# Patient Record
Sex: FEMALE | Race: WHITE
[De-identification: names, ages, dates, MRNs, and addresses within clinical notes are randomized per-mention and may not be internally consistent; named-entity substitution may affect disease eponyms.]

---

## 2017-12-05 ENCOUNTER — HOSPITAL ENCOUNTER (OUTPATIENT)
Dept: HOSPITAL 45 - C.NUCL | Age: 32
Discharge: HOME | End: 2017-12-05
Attending: UROLOGY
Payer: COMMERCIAL

## 2017-12-05 DIAGNOSIS — N13.30: Primary | ICD-10-CM

## 2017-12-05 LAB
ANION GAP SERPL CALC-SCNC: 4 MMOL/L (ref 3–11)
BUN SERPL-MCNC: 9 MG/DL (ref 7–18)
BUN/CREAT SERPL: 9.9 (ref 10–20)
CALCIUM SERPL-MCNC: 8.8 MG/DL (ref 8.5–10.1)
CHLORIDE SERPL-SCNC: 106 MMOL/L (ref 98–107)
CO2 SERPL-SCNC: 27 MMOL/L (ref 21–32)
CREAT SERPL-MCNC: 0.86 MG/DL (ref 0.6–1.2)
GLUCOSE SERPL-MCNC: 82 MG/DL (ref 70–99)
POTASSIUM SERPL-SCNC: 3.7 MMOL/L (ref 3.5–5.1)
SODIUM SERPL-SCNC: 137 MMOL/L (ref 136–145)

## 2017-12-05 NOTE — DIAGNOSTIC IMAGING REPORT
RENAL SCAN DIURETIC (MAG 3)



HISTORY:  32 years-old Female N13.30 [id number] hydronephrosis of

the right kidney with back pain.



COMPARISON: None available



TECHNIQUE: Renal scan was obtained with 8.8 mCi technetium 99 MAG3. Lasix was

also administered.



FINDINGS: 

There is normal flow and excretion of the left kidney. The left renal collecting

system and left renal pelvis appears somewhat patulous.



There is asymmetrically decreased flow and excretion of the right kidney. Split

function is 88.7% on the left and 11.3% on the right. There is delayed excretion

of contrast from the right kidney. Excretion is difficult to quantify secondary

to lack of significant tracer within the right kidney and collecting system. No

significant tracer retention.



IMPRESSION:

Significantly decreased flow and excretion of the right kidney. Split function

of 88.7% on the left and 11.3% on the right.









The above report was generated using voice recognition software. It may contain

grammatical, syntax or spelling errors.







Electronically signed by:  Rasta Aguilera M.D.

12/5/2017 1:59 PM



Dictated Date/Time:  12/5/2017 12:52 PM

## 2017-12-27 ENCOUNTER — HOSPITAL ENCOUNTER (OUTPATIENT)
Dept: HOSPITAL 45 - C.CPL | Age: 32
Discharge: HOME | End: 2017-12-27
Attending: UROLOGY
Payer: COMMERCIAL

## 2017-12-27 DIAGNOSIS — N13.30: Primary | ICD-10-CM

## 2017-12-27 LAB
BASOPHILS # BLD: 0.04 K/UL (ref 0–0.2)
BASOPHILS NFR BLD: 0.8 %
BUN SERPL-MCNC: 13 MG/DL (ref 7–18)
CALCIUM SERPL-MCNC: 9 MG/DL (ref 8.5–10.1)
CO2 SERPL-SCNC: 25 MMOL/L (ref 21–32)
CREAT SERPL-MCNC: 0.85 MG/DL (ref 0.6–1.2)
EOS ABS #: 0.75 K/UL (ref 0–0.5)
EOSINOPHIL NFR BLD AUTO: 161 K/UL (ref 130–400)
GLUCOSE SERPL-MCNC: 94 MG/DL (ref 70–99)
HCT VFR BLD CALC: 36.3 % (ref 37–47)
HGB BLD-MCNC: 11.8 G/DL (ref 12–16)
IG#: 0 K/UL (ref 0–0.02)
IMM GRANULOCYTES NFR BLD AUTO: 30.8 %
LYMPHOCYTES # BLD: 1.47 K/UL (ref 1.2–3.4)
MCH RBC QN AUTO: 29.6 PG (ref 25–34)
MCHC RBC AUTO-ENTMCNC: 32.5 G/DL (ref 32–36)
MCV RBC AUTO: 91.2 FL (ref 80–100)
MONO ABS #: 0.52 K/UL (ref 0.11–0.59)
MONOCYTES NFR BLD: 10.9 %
NEUT ABS #: 1.99 K/UL (ref 1.4–6.5)
NEUTROPHILS # BLD AUTO: 15.7 %
NEUTROPHILS NFR BLD AUTO: 41.8 %
PMV BLD AUTO: 10.4 FL (ref 7.4–10.4)
POTASSIUM SERPL-SCNC: 3.6 MMOL/L (ref 3.5–5.1)
RED CELL DISTRIBUTION WIDTH CV: 12.8 % (ref 11.5–14.5)
RED CELL DISTRIBUTION WIDTH SD: 42.9 FL (ref 36.4–46.3)
SODIUM SERPL-SCNC: 139 MMOL/L (ref 136–145)
WBC # BLD AUTO: 4.77 K/UL (ref 4.8–10.8)

## 2017-12-27 NOTE — DIAGNOSTIC IMAGING REPORT
CHEST 2 VIEWS ROUTINE



CLINICAL HISTORY: 32 years-old Female presenting with HYDRONEPHROSIS, N13.30,

presurgical testing. 



TECHNIQUE: PA and lateral views of the chest were obtained.



COMPARISON: None.



FINDINGS:

Cardiomediastinal silhouette normal. Lungs and pleural spaces clear. Osseous

structures normal. Upper abdomen normal.



IMPRESSION:

1.  No acute cardiopulmonary disease.







Electronically signed by:  Jacobo Vance M.D.

12/27/2017 12:38 PM



Dictated Date/Time:  12/27/2017 12:38 PM

## 2018-01-02 ENCOUNTER — HOSPITAL ENCOUNTER (OUTPATIENT)
Dept: HOSPITAL 45 - C.ACU | Age: 33
Discharge: HOME | End: 2018-01-02
Attending: UROLOGY
Payer: COMMERCIAL

## 2018-01-02 VITALS
SYSTOLIC BLOOD PRESSURE: 125 MMHG | OXYGEN SATURATION: 100 % | TEMPERATURE: 98.42 F | HEART RATE: 99 BPM | DIASTOLIC BLOOD PRESSURE: 86 MMHG

## 2018-01-02 VITALS — SYSTOLIC BLOOD PRESSURE: 123 MMHG | OXYGEN SATURATION: 100 % | DIASTOLIC BLOOD PRESSURE: 79 MMHG | HEART RATE: 74 BPM

## 2018-01-02 VITALS
DIASTOLIC BLOOD PRESSURE: 87 MMHG | HEART RATE: 76 BPM | TEMPERATURE: 97.52 F | SYSTOLIC BLOOD PRESSURE: 127 MMHG | OXYGEN SATURATION: 99 %

## 2018-01-02 VITALS
BODY MASS INDEX: 19.01 KG/M2 | WEIGHT: 118.26 LBS | HEIGHT: 65.98 IN | WEIGHT: 118.26 LBS | BODY MASS INDEX: 19.01 KG/M2 | HEIGHT: 65.98 IN

## 2018-01-02 VITALS
HEART RATE: 78 BPM | DIASTOLIC BLOOD PRESSURE: 72 MMHG | OXYGEN SATURATION: 99 % | TEMPERATURE: 98.06 F | SYSTOLIC BLOOD PRESSURE: 127 MMHG

## 2018-01-02 DIAGNOSIS — D48.1: ICD-10-CM

## 2018-01-02 DIAGNOSIS — Z79.899: ICD-10-CM

## 2018-01-02 DIAGNOSIS — N13.30: Primary | ICD-10-CM

## 2018-01-02 LAB
BASOPHILS # BLD: 0.09 K/UL (ref 0–0.2)
BASOPHILS NFR BLD: 1.9 %
EOS ABS #: 0.42 K/UL (ref 0–0.5)
EOSINOPHIL NFR BLD AUTO: 170 K/UL (ref 130–400)
HCT VFR BLD CALC: 35 % (ref 37–47)
HGB BLD-MCNC: 12.2 G/DL (ref 12–16)
IG#: 0.01 K/UL (ref 0–0.02)
IMM GRANULOCYTES NFR BLD AUTO: 23.7 %
LYMPHOCYTES # BLD: 1.1 K/UL (ref 1.2–3.4)
MCH RBC QN AUTO: 31.9 PG (ref 25–34)
MCHC RBC AUTO-ENTMCNC: 34.9 G/DL (ref 32–36)
MCV RBC AUTO: 91.6 FL (ref 80–100)
MONO ABS #: 0.43 K/UL (ref 0.11–0.59)
MONOCYTES NFR BLD: 9.2 %
NEUT ABS #: 2.6 K/UL (ref 1.4–6.5)
NEUTROPHILS # BLD AUTO: 9 %
NEUTROPHILS NFR BLD AUTO: 56 %
PMV BLD AUTO: 10.3 FL (ref 7.4–10.4)
RED CELL DISTRIBUTION WIDTH CV: 13 % (ref 11.5–14.5)
RED CELL DISTRIBUTION WIDTH SD: 43.5 FL (ref 36.4–46.3)
WBC # BLD AUTO: 4.65 K/UL (ref 4.8–10.8)

## 2018-01-02 NOTE — ANESTHESIOLOGY PROGRESS NOTE
Anesthesia Post Op Note


Date & Time


Jan 2, 2018 at 12:32





Vital Signs


Pain Intensity:  0





Vital Signs Past 12 Hours








  Date Time  Temp Pulse Resp B/P (MAP) Pulse Ox O2 Delivery O2 Flow Rate FiO2


 


1/2/18 11:00 36.4 76 18 127/87 99 Room Air  


 


1/2/18 10:55 36.9 86 20 131/81 100 Room Air  


 


1/2/18 10:45  96 20 130/74 99 Room Air  


 


1/2/18 10:38 36.6 101 18 125/83 100 Room Air  


 


1/2/18 08:50 36.9 99 18 125/86 (99) 100 Room Air  











Notes


Mental Status:  alert / awake / arousable, participated in evaluation


Pt Amnestic to Procedure:  Yes


Nausea / Vomiting:  adequately controlled


Pain:  adequately controlled


Airway Patency, RR, SpO2:  stable & adequate


BP & HR:  stable & adequate


Hydration State:  stable & adequate


Anesthetic Complications:  no major complications apparent

## 2018-01-02 NOTE — DISCHARGE INSTRUCTIONS
Discharge Instructions


Date of Service


Jan 2, 2018.





Admission


Reason for Admission:  Hydronephrosis





Discharge


Discharge Diagnosis / Problem:  hydronephrosis





Discharge Goals


Goal(s):  Decrease discomfort, Improve function, Increase independence, Improve 

disease control





Activity Recommendations


Activity Limitations:  resume your previous activity


Lifting Limitations:  none


Exercise/Sports Limitations:  none


May Resume Sexual Activity:  when tolerated


Shower/Bathe:  no limitations


Driving or Machine Use:  resume 1 day after discharge





.





Instructions / Follow-Up


Instructions / Follow-Up


please keep your previously scheduled follow up appointment





Current Hospital Diet


Patient's current hospital diet:





Discharge Diet


Recommended Diet:  Regular Diet





Pending Studies


Studies pending at discharge:  no





Medical Emergencies








.


Who to Call and When:





Medical Emergencies:  If at any time you feel your situation is an emergency, 

please call 911 immediately.





.





Non-Emergent Contact


Non-Emergency issues call your:  Urologist


Call Non-Emergent contact if:  you have a fever, temperature is above 101.5





.


.








"Provider Documentation" section prepared by Brandon Dowling.








.





VTE Core Measure


Inpt VTE Proph given/why not?:  Treatment not indicated





[place] Drug Monitoring Program


Search Results:  patient reviewed within database, no issues identified

## 2018-01-02 NOTE — DIAGNOSTIC IMAGING REPORT
RETROGRADE INCLUDES KUB



CLINICAL HISTORY: RT URETEROSCOPY, STENT INSERTION    



COMPARISON STUDY:  MRI of the abdomen August 22, 2017.



Fluoroscopy time: 29.2 seconds.



FINDINGS: 5 fluoroscopic images from right retrograde exam were submitted for

interpretation. These images demonstrate medial deviation of the right ureter.

The right ureter was cannulated. Note is made of moderate right

hydroureteronephrosis with smooth severe tapering of the mid right ureter at the

level the inferior sacroiliac joints. Subsequent images demonstrate placement of

a right ureteral stent. Right pelvic surgical clips are noted.



IMPRESSION:  Fluoroscopic images from right retrograde exam demonstrating

moderate right hydroureteronephrosis, smooth severe tapering/narrowing of the

mid right ureter of indeterminate etiology and medial deviation of the right

ureter. Placement of a right ureteral stent. 









Electronically signed by:  Jose Garzon M.D.

1/2/2018 11:14 AM



Dictated Date/Time:  1/2/2018 11:10 AM

## 2018-01-02 NOTE — [facility] OPERATIVE REPORT
Operative Report


Operative Date


Jan 2, 2018.





Pre-Operative Diagnosis





Hydronephrosis





Post-Operative Diagnosis





same





Procedure(s) Performed





Cystoscopy, Right Stent [id number]





Surgeon


Dr. Ortega





Assistant Surgeon(s)


none





Estimated Blood Loss


0 ML





Findings


Severe medial deviation of the right ureter; unable to accommodate a 6 Tu[place] 

stent or ureteral catheter





Specimens





none per surgeon





Drains


[id number]





Anesthesia


MAC





Complication(s)


None





Disposition


Recovery Room / PACU (stable)





Indications


Right hydronephrosis





Description of Procedure


The patient was identified, consent reviewed, and she was transported to the 

operating suite.  Sedation was achieved as well as ciprofloxacin administered.  

She was placed in dorsal lithotomy position where she was sterilely prepped and 

draped.  I began the case by passing a 22 Tu[place] cystoscope with 30 lens.  

Full inspection of the bladder was carried out revealing no gross abnormalities 

to the bladder mucosa.  The right ureteral orifice was then cannulated with a 

sensor wire and a 5 Tu[place] open-ended catheter.  The wire was advanced under 

fluoroscopic guidance.  Of note, there was significant resistance with 

advancement of the wire.  I attempted to advance a 5 Tu[place] open-ended catheter 

but again met significant resistance.  There was clear, significant medial 

deviation of the ureter.  I opacified the collecting system to ensure that was 

within the ureter and this revealed no blurry distal ureter with a dilated upper

/proximal section.  I replaced the wire advanced it to the kidney.  I then 

attempted to place a 6 Tu[place] ureteral stent but was unsuccessful with my 

ability to advance this through the distal ureter.  I subsequently exchanged 

this for a 5 Tu[place] by 22-32 centimeter double-J ureteral stent.  This stent 

advanced into the kidney.  She has a bit of redundant stent in the kidney 

itself but a good curl distally.  I subsequently emptied her bladder and 

concluded the case.


I attest to the content of the Intraoperative Record and any orders documented 

therein.  Any exceptions are noted below.

## 2018-02-06 ENCOUNTER — HOSPITAL ENCOUNTER (OUTPATIENT)
Dept: HOSPITAL 45 - C.NUCL | Age: 33
Discharge: HOME | End: 2018-02-06
Attending: UROLOGY
Payer: COMMERCIAL

## 2018-02-06 DIAGNOSIS — N13.30: Primary | ICD-10-CM

## 2018-02-06 NOTE — DIAGNOSTIC IMAGING REPORT
RENAL SCAN DIURETIC (MAG 3)



CLINICAL HISTORY: N13.30 Hydronephrosis obstructive change.



TECHNIQUE: This study was obtained following administration of 9.1 mCi

technetium 99m MAG3. 20 mg Lasix was administered.



COMPARISON STUDY:  12/5/2017



FINDINGS: Study is similar compared to the prior exam. There is considerable

asymmetry in total renal function. Uptake and washout characteristics of the

left kidney are unremarkable. Total function of the left kidney is 92% compared

to a prior 88.7%.



The right kidney shows minimal function at approximately 8%. This is slightly

diminished from the prior study of 11.3%. There continues to be a decrease in

excretion rate of the right kidney compared to the left.



IMPRESSION:  

1. Significantly decreased flow in function of the right kidney with current

function estimated at 8 % of the total.





2. Left kidney functions appropriately with a total function estimated at 92% of

the total. 

3. Maximum right renal function is slightly diminished compared to the prior

study.









The above report was generated using voice recognition software.  It may contain

grammatical, syntax or spelling errors.







Electronically signed by:  Akhil Stuart M.D.

2/6/2018 1:55 PM



Dictated Date/Time:  2/6/2018 1:39 PM

## 2018-02-19 ENCOUNTER — HOSPITAL ENCOUNTER (OUTPATIENT)
Dept: HOSPITAL 45 - C.LABPBG | Age: 33
Discharge: HOME | End: 2018-02-19
Attending: UROLOGY
Payer: COMMERCIAL

## 2018-02-19 DIAGNOSIS — N13.30: Primary | ICD-10-CM

## 2018-02-19 LAB
BASOPHILS # BLD: 0.07 K/UL (ref 0–0.2)
BASOPHILS NFR BLD: 1.8 %
BUN SERPL-MCNC: 11 MG/DL (ref 7–18)
CALCIUM SERPL-MCNC: 8.8 MG/DL (ref 8.5–10.1)
CO2 SERPL-SCNC: 25 MMOL/L (ref 21–32)
CREAT SERPL-MCNC: 0.85 MG/DL (ref 0.6–1.2)
EOS ABS #: 0.44 K/UL (ref 0–0.5)
EOSINOPHIL NFR BLD AUTO: 162 K/UL (ref 130–400)
GLUCOSE SERPL-MCNC: 77 MG/DL (ref 70–99)
HCT VFR BLD CALC: 33.4 % (ref 37–47)
HGB BLD-MCNC: 11.5 G/DL (ref 12–16)
IG#: 0 K/UL (ref 0–0.02)
IMM GRANULOCYTES NFR BLD AUTO: 36.1 %
LYMPHOCYTES # BLD: 1.39 K/UL (ref 1.2–3.4)
MCH RBC QN AUTO: 31.5 PG (ref 25–34)
MCHC RBC AUTO-ENTMCNC: 34.4 G/DL (ref 32–36)
MCV RBC AUTO: 91.5 FL (ref 80–100)
MONO ABS #: 0.38 K/UL (ref 0.11–0.59)
MONOCYTES NFR BLD: 9.9 %
NEUT ABS #: 1.57 K/UL (ref 1.4–6.5)
NEUTROPHILS # BLD AUTO: 11.4 %
NEUTROPHILS NFR BLD AUTO: 40.8 %
PMV BLD AUTO: 10.6 FL (ref 7.4–10.4)
POTASSIUM SERPL-SCNC: 3.7 MMOL/L (ref 3.5–5.1)
RED CELL DISTRIBUTION WIDTH CV: 12.8 % (ref 11.5–14.5)
RED CELL DISTRIBUTION WIDTH SD: 43.3 FL (ref 36.4–46.3)
SODIUM SERPL-SCNC: 138 MMOL/L (ref 136–145)
WBC # BLD AUTO: 3.85 K/UL (ref 4.8–10.8)

## 2018-02-27 ENCOUNTER — HOSPITAL ENCOUNTER (OUTPATIENT)
Dept: HOSPITAL 45 - C.MRI | Age: 33
Discharge: HOME | End: 2018-02-27
Attending: INTERNAL MEDICINE
Payer: COMMERCIAL

## 2018-02-27 DIAGNOSIS — N26.1: ICD-10-CM

## 2018-02-27 DIAGNOSIS — D48.1: Primary | ICD-10-CM

## 2018-02-27 NOTE — DIAGNOSTIC IMAGING REPORT
PELVIC COMBO, ABDOMEN COMBO



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

RECURRENCE, REMOVED TWICE. 



TECHNIQUE: Multisequence, multiplanar MR imaging of the abdomen and pelvis was

performed before and after the administration of intravenous contrast. IV

contrast: 5 mL of Gadavist. 



COMPARISON: MR of the abdomen from 8/22/2017 performed at an outside hospital.



FINDINGS:



Localizer images: Unremarkable.



Lung bases: Lung bases clear. Normal heart size. No pericardial or pleural

effusion. 



Liver: Normal morphology. No liver lesion. Patent hepatic vasculature.



Biliary: No intrahepatic or extrahepatic biliary ductal dilatation. Normal

gallbladder.



Pancreas: Normal.



Spleen: Normal.



Adrenal glands: Normal.



Kidneys and ureters: Decreased dilatation of the right renal collecting system

and resultant atrophy of the right kidney, likely sequela of chronic

hydronephrosis as demonstrated on the prior exam.



Bladder: Normal.



Pelvic organs: Uterus normal though displaced towards the left pelvis. The right

ovary is also displaced and contains a 3.5 cm cyst (series 9 image 14).



Bowel: Normal. No bowel obstruction.



Peritoneal cavity: No free fluid or intraperitoneal gas.



Lymph nodes: No enlarged lymph nodes in the abdomen or pelvis.



Vasculature: Aorta and IVC patent and normal in caliber.



Abdominal wall: Normal.



Musculoskeletal: The extraperitoneal mass in the region of the right iliacus and

right obturator muscles now measures 8.8 x 6.2 cm in maximal axial dimension,

previously 8.1 x 5.9 cm. This again expands the musculature and exerts mass

effect on the intrapelvic contents. This extends minimally through the sciatic

notch. This also extends along the inferior margin of the right superior pubic

ramus. A portion of the mass may also track along the right femoral vasculature.

The mass demonstrates avid enhancement on postcontrast imaging along the

periphery periphery and in the lobular components along the sciatic notch and

pubic ramus. However, the central and superficial components of the dominant

portion of the mass are largely hypovascular, suggesting a post treatment

response. T2 hyperintensity of the right external and internal obturator muscle

may relate to infiltration by the mass or degeneration.





IMPRESSION:

1.  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

limited vascularization of the central and superficial components of the

dominant portion in comparison to the prior exam though the deeper and

peripheral components remain viable. The multilobulated mass in the lesser

components, specifically along the sciatic notch and right superior pubic ramus,

do not demonstrate the vascularization.



2.  Interval development of chronic atrophy of the right kidney likely secondary

to chronic hydronephrosis.



3.  Interval development of a 3.5 cm right ovarian cyst. This is also a benign,

however, follow-up ultrasound in 6-12 weeks could be considered to ensure

resolution.







Electronically signed by:  Jacobo Vance M.D.

2/27/2018 11:10 AM



Dictated Date/Time:  2/27/2018 10:52 AM

## 2018-03-05 NOTE — CODING QUERY NO DIAGNOSIS
TREATMENT RENDERED WITHOUT A DIAGNOSIS                                                  



Dr. Lau,



To promote full compliance with coding requirements relating to patient care, physician 
participation is requested in all cases of  uncertainty.  Please assist us with 
providing a diagnosis/symptom for the test(s) below:



A diagnosis/symptom was not documented on your Order.  A valid diagnosis/symptom is 
required to bill all insurances.



**Please remember that we are unable to code a diagnosis of rule out, probable, possible, 
questionable, or suspected.  



Tests that require a diagnosis:





* MRI ABDOMEN COMBO      DIAGNOSIS:



* MRI PELVIS COMBO    DIAGNOSIS:





***DATE OF SERVICE: 2/27/18***





Provider Signature: _____________________________ Date: _________



Thank you  

Valente Corley

[facility] Information Management

Phone:  [phone]

Fax:  [phone]



Once completed, please kindly fax back to [phone]



For questions please call [phone]

## 2018-03-06 ENCOUNTER — HOSPITAL ENCOUNTER (OUTPATIENT)
Dept: HOSPITAL 45 - C.ACU | Age: 33
Discharge: HOME | End: 2018-03-06
Attending: UROLOGY
Payer: COMMERCIAL

## 2018-03-06 VITALS
WEIGHT: 118.17 LBS | BODY MASS INDEX: 18.99 KG/M2 | HEIGHT: 65.98 IN | HEIGHT: 65.98 IN | BODY MASS INDEX: 18.99 KG/M2 | WEIGHT: 118.17 LBS | BODY MASS INDEX: 18.99 KG/M2

## 2018-03-06 VITALS
SYSTOLIC BLOOD PRESSURE: 119 MMHG | HEART RATE: 77 BPM | TEMPERATURE: 98.24 F | DIASTOLIC BLOOD PRESSURE: 68 MMHG | OXYGEN SATURATION: 100 %

## 2018-03-06 VITALS
OXYGEN SATURATION: 99 % | TEMPERATURE: 99.32 F | HEART RATE: 80 BPM | DIASTOLIC BLOOD PRESSURE: 65 MMHG | SYSTOLIC BLOOD PRESSURE: 117 MMHG

## 2018-03-06 VITALS — HEART RATE: 72 BPM | SYSTOLIC BLOOD PRESSURE: 122 MMHG | DIASTOLIC BLOOD PRESSURE: 72 MMHG | OXYGEN SATURATION: 99 %

## 2018-03-06 VITALS
HEART RATE: 70 BPM | OXYGEN SATURATION: 98 % | TEMPERATURE: 98.24 F | DIASTOLIC BLOOD PRESSURE: 75 MMHG | SYSTOLIC BLOOD PRESSURE: 130 MMHG

## 2018-03-06 DIAGNOSIS — Z80.9: ICD-10-CM

## 2018-03-06 DIAGNOSIS — N13.30: Primary | ICD-10-CM

## 2018-03-06 NOTE — DISCHARGE INSTRUCTIONS
Discharge Instructions


Date of Service


Mar 6, 2018.





Admission


Reason for Admission:  Hydronephrosis





Discharge


Discharge Diagnosis / Problem:  hydronephrosis





Discharge Goals


Goal(s):  Decrease discomfort, Improve function, Increase independence, Improve 

disease control





Activity Recommendations


Activity Limitations:  resume your previous activity


Lifting Limitations:  none


Exercise/Sports Limitations:  none


May Resume Sexual Activity:  when tolerated


Shower/Bathe:  no limitations


Driving or Machine Use:  resume 1 day after discharge





.





Instructions / Follow-Up


Instructions / Follow-Up


Dr. Ortega's office will call you to set up your next visit.





Current Hospital Diet


Patient's current hospital diet:





Discharge Diet


Recommended Diet:  Regular Diet





Procedures


Procedures Performed:  


Cystoscopy, Right Stent Exchange ([id number])





Pending Studies


Studies pending at discharge:  no





Medical Emergencies








.


Who to Call and When:





Medical Emergencies:  If at any time you feel your situation is an emergency, 

please call 911 immediately.





.





Non-Emergent Contact


Non-Emergency issues call your:  Urologist


Call Non-Emergent contact if:  you have a fever, temperature is above 101.5, 

your pain is not controlled, your pain is worsening





.


.








"Provider Documentation" section prepared by Brandon Dowling.








.

## 2018-03-06 NOTE — ANESTHESIOLOGY PROGRESS NOTE
Anesthesia Post Op Note


Date & Time


Mar 6, 2018 at 14:37





Vital Signs


Pain Intensity:  0





Vital Signs Past 12 Hours








  Date Time  Temp Pulse Resp B/P (MAP) Pulse Ox O2 Delivery O2 Flow Rate FiO2


 


3/6/18 14:21 36.8 95 13 123/73 100 Oxymask 10 


 


3/6/18 12:42 36.8 77 16 119/68 (85) 100 Room Air  











Notes


Mental Status:  alert / awake / arousable, participated in evaluation


Pt Amnestic to Procedure:  Yes


Nausea / Vomiting:  adequately controlled


Pain:  adequately controlled


Airway Patency, RR, SpO2:  stable & adequate


BP & HR:  stable & adequate


Hydration State:  stable & adequate


Anesthetic Complications:  no major complications apparent

## 2018-03-06 NOTE — [facility] POST OPERATIVE BRIEF NOTE
Immediate Operative Summary


Operative Date


Mar 6, 2018.





Pre-Operative Diagnosis





Right hydronephrosis





Post-Operative Diagnosis





Same as preop





Procedure(s) Performed





Cystoscopy, Right Stent Exchange ([id number])





Surgeon


Dr. Ortega





Assistant Surgeon(s)


none





Estimated Blood Loss


0 ml





Findings


Consistent with Post-Op Diagnosis





Specimens





none, as per surgeon





Anesthesia Type


General





Complication(s)


none





Disposition


Accompanied Pt To Recover:  yes


Disposition:  Recovery Room / PACU

## 2018-03-06 NOTE — DIAGNOSTIC IMAGING REPORT
KUB



CLINICAL HISTORY: RT STENT stent exchange



TECHNIQUE: Image intensifier



COMPARISON STUDY:  None



FINDINGS: Image intensifier usage was provided for a right ureteral stent

exchange.



IMPRESSION:  Right ureteral stent exchange with image intensifier assistance. 









The above report was generated using voice recognition software.  It may contain

grammatical, syntax or spelling errors.







Electronically signed by:  Akhil Stuart M.D.

3/6/2018 2:26 PM



Dictated Date/Time:  3/6/2018 2:25 PM

## 2018-03-06 NOTE — [facility] OPERATIVE REPORT
Operative Report


Operative Date


Mar 6, 2018.





Pre-Operative Diagnosis





Right hydronephrosis





Post-Operative Diagnosis





Same as preop





Procedure(s) Performed





Cystoscopy, Right Stent Exchange ([id number])





Surgeon


Dr. Ortega





Assistant Surgeon(s)


none





Estimated Blood Loss


0 ml





Specimens





none, as per surgeon





Drains


[id number] stent





Anesthesia Type


General





Complication(s)


none





Disposition


yes


Recovery Room / PACU





Indications


Right hydronephrosis





Description of Procedure


The patient was identified in the preoperative holding area, appropriate 

informed consents reviewed and completed and she was transported to the 

operating suite.  Upon arrival she received appropriate preoperative 

antibiotics in the form of ciprofloxacin.  Adequate sedation was achieved, and 

she was placed in dorsal lithotomy position where she was sterilely prepped and 

draped in standard fashion.  I began the case by passing a 22 Chinese cystoscope 

with 30 lens.  Inspection of the bladder revealed healthy appearing bladder 

mucosa with a right ureteral stent easily seen protruding from the right 

ureteral orifice.  I grasped the distal end of the stent and while watching the 

proximal end under fluoroscopy, I gently withdrew the stent.  I then intubated 

the stent with a sensor wire which was advanced to the kidney without 

difficulty.  I withdrew the stent entirely before placing a new 6 Chinese by 26 

cm double-J ureteral stent.  This upsized stent passed easily without 

significant resistance -which represents a significant change from the time of 

her prior stent placement where it was very challenging to get the stent beyond 

the obstructing mass.  We saw an excellent curl in the kidney as well as the 

bladder, and emptied her bladder before concluding the case.  There were no 

complications and she tolerated the procedure very well.


I attest to the content of the Intraoperative Record and any orders documented 

therein.  Any exceptions are noted below.

## 2018-04-19 ENCOUNTER — HOSPITAL ENCOUNTER (OUTPATIENT)
Dept: HOSPITAL 45 - C.NUCL | Age: 33
Discharge: HOME | End: 2018-04-19
Attending: UROLOGY
Payer: COMMERCIAL

## 2018-04-19 DIAGNOSIS — N13.30: Primary | ICD-10-CM

## 2018-04-19 NOTE — DIAGNOSTIC IMAGING REPORT
RENAL SCAN DIURETIC (MAG 3)



CLINICAL HISTORY: HYDRONEPHROSIS    



COMPARISON STUDY:  2/6/2018



FINDINGS: The patient was injected with 8.6 mCi of technetium 99m MAG3.



There is markedly diminished flow to the right kidney.



Fractionated function is 89% on the left 11% on the right.



There is a flattened excretory curve on the right. The time to half maximum on

the right is 36 minutes.



The time to half maximum on the left is mildly prolonged measuring 23 minutes.



IMPRESSION:  

1. No significant change from the preceding study performed in February 2018

2. Persistent decreased right renal flow and function. 

3. Fractionated function is 89% on the left 11% on the right.







Electronically signed by:  Ki Coffey M.D.

4/19/2018 11:44 AM



Dictated Date/Time:  4/19/2018 11:37 AM

## 2018-05-15 ENCOUNTER — HOSPITAL ENCOUNTER (OUTPATIENT)
Dept: HOSPITAL 45 - C.CTS | Age: 33
Discharge: HOME | End: 2018-05-15
Attending: INTERNAL MEDICINE
Payer: COMMERCIAL

## 2018-05-15 DIAGNOSIS — D48.1: Primary | ICD-10-CM

## 2018-05-15 NOTE — DIAGNOSTIC IMAGING REPORT
CT SCAN OF THE ABDOMEN AND PELVIS WITH IV CONTRAST



CLINICAL HISTORY:   Desmoid tumor.



COMPARISON STUDY:  Abdominal and pelvic MRI dated 2/27/2018.



TECHNIQUE: Following the IV administration of  94 cc of Optiray 320, CT scan of

the abdomen and pelvis is performed from the lung bases to the proximal femora.

Images are reviewed in the axial, sagittal, and coronal planes. IV contrast was

administered without complication. A dose lowering technique was utilized

adhering to the principles of ALARA. 



CT DOSE: 294.21 mGycm



FINDINGS:



Lung bases: The heart is normal in size and without pericardial effusion. The

lung bases are clear.



Liver: The contrast-enhanced liver is normal in size, contour, and attenuation.

Fatty infiltration is noted adjacent to falciform ligament. There is no

intrahepatic biliary ductal dilatation. The hepatic veins and portal veins are

patent.



Gallbladder: Unremarkable.



Spleen: Normal in size and attenuation.



Pancreas: Unremarkable.



Adrenal glands: Unremarkable.



Kidneys: There is asymmetric cortical atrophy of the right kidney as compared to

the left. A right ureteral stent is in appropriate position. There is mild to

moderate right-sided hydronephrosis. No hydronephrosis is seen on the left.

There is heterogeneously diminished enhancement of the right kidney as compared

to the left.



Abdominal vasculature: The abdominal aorta is normal in course and caliber.



Bowel: The small bowel and colon are normal in course and caliber. The appendix

is  well-visualized and normal.



Peritoneum: There is no intraperitoneal free air or abdominal ascites.



Lymphadenopathy: None.



Pelvic viscera: Although decompressed, the bladder wall appears thickened and

pericystic infiltration is identified. The uterus is normal as imaged. Follicles

are noted in the left ovary. A large mass lesion is again noted along the right

pelvic sidewall. This is best seen on image #276 and measures approximately 10 x

11 x 6 cm. There is central low-attenuation within this mass that may represent

cystic change versus necrosis. Components of this mass lesion extend along the

right sciatic notch. This also extends along the right inguinal canal into the

right groin. A component tracks along the course of the common femoral vessels

on image #327, and measures 3.1 x 3.2 cm in transaxial diameter. This encases

and may occlude the right external iliac and common femoral veins. Venous

collaterals are noted in the pelvic soft tissues. A component in the presacral

region seen on image #301 and measures 2.3 x 3.0 cm.



Skeletal structures: No lytic or blastic lesions are seen.





IMPRESSION:



1. There is a large complex mass lesion centered along the right pelvic sidewall

as detailed above. This is consistent with the reported clinical history of

desmoid tumor, and this mass lesion has modestly increased in size from

2/27/2018.



2. A component of this mass lesion extends into the right groin. This encases

and may occlude the right external iliac and common femoral veins.



3. A right ureteral stent is in place. There is mild/moderate right-sided

hydronephrosis and heterogeneous perfusion of the right kidney as compared to

left. Correlate clinically for evidence of stent dysfunction.



4. There is no evidence of distant metastatic disease in the abdomen.



5. Nonspecific free fluid is seen in the cul-de-sac.







Electronically signed by:  Pollo Maharaj M.D.

5/15/2018 10:53 AM



Dictated Date/Time:  5/15/2018 10:38 AM

## 2018-08-24 ENCOUNTER — HOSPITAL ENCOUNTER (OUTPATIENT)
Dept: HOSPITAL 45 - C.LABPBG | Age: 33
Discharge: HOME | End: 2018-08-24
Attending: INTERNAL MEDICINE
Payer: COMMERCIAL

## 2018-08-24 DIAGNOSIS — D48.1: Primary | ICD-10-CM

## 2018-08-24 LAB
BASOPHILS # BLD: 0.05 K/UL (ref 0–0.2)
BASOPHILS NFR BLD: 1.1 %
EOS ABS #: 0.16 K/UL (ref 0–0.5)
EOSINOPHIL NFR BLD AUTO: 250 K/UL (ref 130–400)
HCT VFR BLD CALC: 37.8 % (ref 37–47)
HGB BLD-MCNC: 12.9 G/DL (ref 12–16)
IG#: 0 K/UL (ref 0–0.02)
IMM GRANULOCYTES NFR BLD AUTO: 32.6 %
LYMPHOCYTES # BLD: 1.54 K/UL (ref 1.2–3.4)
MCH RBC QN AUTO: 31.1 PG (ref 25–34)
MCHC RBC AUTO-ENTMCNC: 34.1 G/DL (ref 32–36)
MCV RBC AUTO: 91.1 FL (ref 80–100)
MONO ABS #: 0.54 K/UL (ref 0.11–0.59)
MONOCYTES NFR BLD: 11.4 %
NEUT ABS #: 2.44 K/UL (ref 1.4–6.5)
NEUTROPHILS # BLD AUTO: 3.4 %
NEUTROPHILS NFR BLD AUTO: 51.5 %
PMV BLD AUTO: 10.6 FL (ref 7.4–10.4)
RED CELL DISTRIBUTION WIDTH CV: 12.6 % (ref 11.5–14.5)
RED CELL DISTRIBUTION WIDTH SD: 41.5 FL (ref 36.4–46.3)
WBC # BLD AUTO: 4.73 K/UL (ref 4.8–10.8)
